# Patient Record
Sex: FEMALE | Race: WHITE | NOT HISPANIC OR LATINO | URBAN - METROPOLITAN AREA
[De-identification: names, ages, dates, MRNs, and addresses within clinical notes are randomized per-mention and may not be internally consistent; named-entity substitution may affect disease eponyms.]

---

## 2024-10-13 ENCOUNTER — OFFICE VISIT (OUTPATIENT)
Dept: URGENT CARE | Age: 29
End: 2024-10-13
Payer: COMMERCIAL

## 2024-10-13 VITALS
DIASTOLIC BLOOD PRESSURE: 85 MMHG | BODY MASS INDEX: 45.63 KG/M2 | HEART RATE: 102 BPM | OXYGEN SATURATION: 99 % | RESPIRATION RATE: 16 BRPM | WEIGHT: 293 LBS | TEMPERATURE: 98 F | SYSTOLIC BLOOD PRESSURE: 130 MMHG

## 2024-10-13 DIAGNOSIS — J06.9 VIRAL URI: Primary | ICD-10-CM

## 2024-10-13 DIAGNOSIS — H66.002 NON-RECURRENT ACUTE SUPPURATIVE OTITIS MEDIA OF LEFT EAR WITHOUT SPONTANEOUS RUPTURE OF TYMPANIC MEMBRANE: ICD-10-CM

## 2024-10-13 PROCEDURE — 1036F TOBACCO NON-USER: CPT | Performed by: FAMILY MEDICINE

## 2024-10-13 PROCEDURE — 99203 OFFICE O/P NEW LOW 30 MIN: CPT | Performed by: FAMILY MEDICINE

## 2024-10-13 RX ORDER — AMOXICILLIN AND CLAVULANATE POTASSIUM 875; 125 MG/1; MG/1
1 TABLET, FILM COATED ORAL 2 TIMES DAILY
Qty: 28 TABLET | Refills: 0 | Status: SHIPPED | OUTPATIENT
Start: 2024-10-13 | End: 2024-10-27

## 2024-10-13 NOTE — PATIENT INSTRUCTIONS
Give medication as directed with food until completed   Tylenol or Motrin as needed for pain and fever   encourage fluids and rest   follow-up if symptoms worsen   see PCP in 5 to 7 days if no improvement

## 2024-10-13 NOTE — PROGRESS NOTES
Subjective   Patient ID: Payton Armendariz is a 29 y.o. female. They present today with a chief complaint of Earache, Sinusitis, Eye Problem, and Facial Pain (On left side/).    History of Present Illness  HPI  2-3 days of left ear pain. Mild nasal congestion with postnasal drip. No fevers or chills. No eye redness, discharge or itching.  No blood or discharge noted from ear.  No ear tenderness.  No nausea vomiting or diarrhea. No chest pain, shortness of breath or wheezing noted. No rashes or skin lesions noted. No known exposures to Mono, strep, pneumonia or influenza. Over-the-counter medications taken for symptoms. Nonsmoker.    Past Medical History  Allergies as of 10/13/2024    (No Known Allergies)       (Not in a hospital admission)       No past medical history on file.    No past surgical history on file.     reports that she has never smoked. She has never used smokeless tobacco.    Review of Systems  Review of Systems   as in history of present illness                                                                                                                                                                                                                                                                      Objective    Vitals:    10/13/24 0841   BP: 130/85   Pulse: 102   Resp: 16   Temp: 36.7 °C (98 °F)   SpO2: 99%   Weight: 136 kg (300 lb)     Patient's last menstrual period was 09/01/2024 (approximate).    Physical Exam  Gen- A&O. NAD at rest.   Ears-right canals and TM's appear unremarkable.  Left ear canal unremarkable but tympanic membrane bulging dull and erythematous with obvious effusion  OP-no erythema or exudates. Some mucous in posterior OP   Neck- mild anterior lymphadenopathy  Lungs- clear to auscultation without wheezes or rhonchi  Skin-no rashes, hives or lesions  Procedures    Point of Care Test & Imaging Results from this visit  No results found for this visit on 10/13/24.   No results  found.    Diagnostic study results (if any) were reviewed by Donnell Vega MD.    Assessment/Plan   Allergies, medications, history, and pertinent labs/EKGs/Imaging reviewed by Donnell Vega MD.     Medical Decision Making  Insert MD    Orders and Diagnoses  Diagnoses and all orders for this visit:  Viral URI  Non-recurrent acute suppurative otitis media of left ear without spontaneous rupture of tympanic membrane  -     amoxicillin-pot clavulanate (Augmentin) 875-125 mg tablet; Take 1 tablet by mouth 2 times a day for 14 days.      Medical Admin Record      Patient disposition: Home    Electronically signed by Donnell Vega MD  8:58 AM